# Patient Record
Sex: FEMALE | Race: WHITE | NOT HISPANIC OR LATINO | Employment: PART TIME | URBAN - NONMETROPOLITAN AREA
[De-identification: names, ages, dates, MRNs, and addresses within clinical notes are randomized per-mention and may not be internally consistent; named-entity substitution may affect disease eponyms.]

---

## 2023-08-07 ENCOUNTER — OFFICE VISIT (OUTPATIENT)
Dept: PRIMARY CARE | Facility: CLINIC | Age: 23
End: 2023-08-07
Payer: COMMERCIAL

## 2023-08-07 VITALS
HEART RATE: 87 BPM | BODY MASS INDEX: 33.09 KG/M2 | OXYGEN SATURATION: 98 % | DIASTOLIC BLOOD PRESSURE: 62 MMHG | WEIGHT: 216 LBS | SYSTOLIC BLOOD PRESSURE: 102 MMHG

## 2023-08-07 DIAGNOSIS — K21.00 GASTROESOPHAGEAL REFLUX DISEASE WITH ESOPHAGITIS WITHOUT HEMORRHAGE: Primary | ICD-10-CM

## 2023-08-07 DIAGNOSIS — F41.9 ANXIETY: ICD-10-CM

## 2023-08-07 DIAGNOSIS — J04.0 LARYNGITIS: ICD-10-CM

## 2023-08-07 DIAGNOSIS — Z72.89 ENGAGES IN VAPING: ICD-10-CM

## 2023-08-07 DIAGNOSIS — F12.10 CANNABIS ABUSE, DAILY USE: ICD-10-CM

## 2023-08-07 PROCEDURE — 99214 OFFICE O/P EST MOD 30 MIN: CPT | Performed by: FAMILY MEDICINE

## 2023-08-07 RX ORDER — SERTRALINE HYDROCHLORIDE 100 MG/1
100 TABLET, FILM COATED ORAL DAILY
COMMUNITY
Start: 2022-11-30 | End: 2023-08-07 | Stop reason: SINTOL

## 2023-08-07 RX ORDER — OMEPRAZOLE 20 MG/1
20 CAPSULE, DELAYED RELEASE ORAL
Qty: 30 CAPSULE | Refills: 2 | Status: SHIPPED | OUTPATIENT
Start: 2023-08-07 | End: 2023-11-05

## 2023-08-07 NOTE — PROGRESS NOTES
Subjective   Patient ID: Alexander Yoon is a 22 y.o. female who presents for Establish Care (Laryngitis frequently.).    HPI     New pt today   Use to be here as a kid - I know her family     Started to get hoarse several mos ago - getting worse     Started to work at [employer] in August 2022  - getting worse since then     Hx of vocal cord nodule when she was young - not sure what happened with that       Was placed on zoloft last year for anxiety -   Made her nauseous  - stopped it   Anxiety issues off and on  -   Not as bad now   Does not feel like she needs meds    MJ smoker daily  -  started at age 19 when her boyfriend broke up with her  -   Had dated him for 5 years   Will take week long breaks  from [employer]J - feels anxiety will get a little worse     Anxiety and mood are better on it -   Sleeps better on it       Vaping daily  - nicotine     Alcohol -   Drinking on a regular basis  - but not as bad as when she turned 22  Parents and friends suggested she should calm down     Lives with parents  -   Work at [employer] -   - since last AUG 2022     Was in school a couple of years  - dropped out     Works out daily     She wonders about ADHD  - friends have mentioned it   Does notice her attention is in all kinds of directions  - has been that way a long time  Her managers at work are concerned about her attention span  She is not     Has not started [employer] yet   Periods are regular   Occas sexually active with men           Review of Systems    Objective   /62 (BP Location: Right arm, Patient Position: Sitting, BP Cuff Size: Large adult)   Pulse 87   Wt 98 kg (216 lb)   SpO2 98%   BMI 33.09 kg/m²     Physical Exam  Vitals reviewed.   Constitutional:       General: She is not in acute distress.     Appearance: Normal appearance.   HENT:      Head: Normocephalic and atraumatic.      Right Ear: Tympanic membrane and ear canal normal.      Left Ear: Tympanic membrane and ear canal normal.      Nose:  Nose normal. No congestion or rhinorrhea.      Mouth/Throat:      Mouth: Mucous membranes are dry.      Pharynx: Oropharynx is clear. No oropharyngeal exudate or posterior oropharyngeal erythema.      Comments: Hoarse voice   Eyes:      Extraocular Movements: Extraocular movements intact.      Conjunctiva/sclera: Conjunctivae normal.      Pupils: Pupils are equal, round, and reactive to light.   Cardiovascular:      Rate and Rhythm: Normal rate and regular rhythm.      Heart sounds: Normal heart sounds. No murmur heard.  Pulmonary:      Effort: Pulmonary effort is normal. No respiratory distress.      Breath sounds: Normal breath sounds. No wheezing.   Abdominal:      General: Abdomen is flat. Bowel sounds are normal.      Palpations: Abdomen is soft.   Musculoskeletal:      Cervical back: Neck supple. No tenderness.   Lymphadenopathy:      Cervical: No cervical adenopathy.   Skin:     General: Skin is warm and dry.      Findings: No rash.   Neurological:      General: No focal deficit present.      Mental Status: She is alert and oriented to person, place, and time.   Psychiatric:         Mood and Affect: Mood normal.         Thought Content: Thought content normal.         Assessment/Plan   Problem List Items Addressed This Visit          Medium    Laryngitis    Relevant Orders    Referral to ENT    Gastroesophageal reflux disease with esophagitis without hemorrhage - Primary    Relevant Medications    omeprazole (PriLOSEC) 20 mg DR capsule    Cannabis abuse, daily use    Engages in vaping    Anxiety        Her primary concern today is her laryngitis -   Discussed how MJ, Vaping and alcohol can all cause inflammation of larynx -   Could be silent aspiration  -   Start omeprazole 20 mg a day     Hx of vocal cord nodule when young -   Urged appt with ENT again - order placed     Discussed how she does use MJ to help her anxiety and possible ADHD  -   Might want to consider something safer for the long run  She does  not want meds at this time     Discussed my concerns about contaminate in MJ products     Urged [employer] -  was willing to schedule with me -   That can be follow up

## 2023-08-07 NOTE — PATIENT INSTRUCTIONS
STRONGLY consider not smoking, vaping or drinking alcohol for a month at least to see if that helps your voice    Take omeprazole 20 mg - one a day  - to calm acid production down in your stomach that can make you hoarse.     I would suggest an appt with an ENT doctor to look at the vocal cords again in case you have a nodule      You can make an appt with me for a well women exam in 1 - 2 months so we can see how this is going

## 2024-06-18 PROCEDURE — 87591 N.GONORRHOEAE DNA AMP PROB: CPT

## 2024-06-18 PROCEDURE — 87491 CHLMYD TRACH DNA AMP PROBE: CPT

## 2024-06-18 PROCEDURE — 88175 CYTOPATH C/V AUTO FLUID REDO: CPT

## 2024-06-19 ENCOUNTER — LAB REQUISITION (OUTPATIENT)
Dept: LAB | Facility: HOSPITAL | Age: 24
End: 2024-06-19
Payer: COMMERCIAL

## 2024-06-19 DIAGNOSIS — Z11.51 ENCOUNTER FOR SCREENING FOR HUMAN PAPILLOMAVIRUS (HPV): ICD-10-CM

## 2024-06-19 DIAGNOSIS — Z01.419 ENCOUNTER FOR GYNECOLOGICAL EXAMINATION (GENERAL) (ROUTINE) WITHOUT ABNORMAL FINDINGS: ICD-10-CM

## 2024-06-20 LAB
C TRACH RRNA SPEC QL NAA+PROBE: NEGATIVE
N GONORRHOEA DNA SPEC QL PROBE+SIG AMP: NEGATIVE

## 2024-06-29 LAB
CYTOLOGY CMNT CVX/VAG CYTO-IMP: NORMAL
LAB AP HPV GENOTYPE QUESTION: YES
LAB AP HPV HR: NORMAL
LAB AP PAP ADDITIONAL TESTS: NORMAL
LABORATORY COMMENT REPORT: NORMAL
PATH REPORT.TOTAL CANCER: NORMAL

## 2025-05-01 ENCOUNTER — OFFICE VISIT (OUTPATIENT)
Dept: PRIMARY CARE | Facility: CLINIC | Age: 25
End: 2025-05-01
Payer: COMMERCIAL

## 2025-05-01 VITALS
WEIGHT: 200 LBS | SYSTOLIC BLOOD PRESSURE: 100 MMHG | DIASTOLIC BLOOD PRESSURE: 60 MMHG | HEART RATE: 87 BPM | BODY MASS INDEX: 30.63 KG/M2 | OXYGEN SATURATION: 97 %

## 2025-05-01 DIAGNOSIS — F41.9 ANXIETY: Primary | ICD-10-CM

## 2025-05-01 PROCEDURE — 1036F TOBACCO NON-USER: CPT

## 2025-05-01 PROCEDURE — 99213 OFFICE O/P EST LOW 20 MIN: CPT

## 2025-05-01 RX ORDER — ESCITALOPRAM OXALATE 10 MG/1
10 TABLET ORAL DAILY
Qty: 30 TABLET | Refills: 5 | Status: SHIPPED | OUTPATIENT
Start: 2025-05-01 | End: 2025-10-28

## 2025-05-01 ASSESSMENT — PATIENT HEALTH QUESTIONNAIRE - PHQ9
2. FEELING DOWN, DEPRESSED OR HOPELESS: NOT AT ALL
SUM OF ALL RESPONSES TO PHQ9 QUESTIONS 1 AND 2: 0
1. LITTLE INTEREST OR PLEASURE IN DOING THINGS: NOT AT ALL

## 2025-05-01 NOTE — PATIENT INSTRUCTIONS
Start lexapro 5 mg for a week or two. This will be 1/2 tablet. After 1 week or so, you can go up to 10 mg.   It can take 4-6 weeks for the medication to work to its full extent, so if you do not feel it working yet do not give up on it. Let me know if you are having side effects otherwise we will re-evaluate at 4-6 weeks.   ANY thoughts of hurting yourself or anyone else, go to the ER.

## 2025-05-01 NOTE — PROGRESS NOTES
Subjective   Patient ID: Alexander Yoon is a 24 y.o. female who presents for Anxiety (3 weeks or so, just feels like can't get it under control.. Issue w/ bf,  issues w/ money.).  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   - financial stress, job stress, trouble with boyfriend   - not having panic attacks   - does not feel like she needs counseling at this time.     Current Medications[1]   Surgical History[2]   Medical History[3]  Social History[4]   Family History[5]   Review of Systems  10 point ROS negative except as otherwise noted in the HPI.      Objective   /60   Pulse 87   Wt 90.7 kg (200 lb)   SpO2 97%   BMI 30.63 kg/m²    Physical Exam  Vitals reviewed.   Constitutional:       Appearance: Normal appearance.   HENT:      Head: Normocephalic and atraumatic.   Eyes:      Extraocular Movements: Extraocular movements intact.      Pupils: Pupils are equal, round, and reactive to light.   Cardiovascular:      Rate and Rhythm: Normal rate and regular rhythm.      Pulses: Normal pulses.      Heart sounds: Normal heart sounds.   Pulmonary:      Effort: Pulmonary effort is normal.      Breath sounds: Normal breath sounds.   Musculoskeletal:         General: Normal range of motion.      Right lower leg: No edema.      Left lower leg: No edema.   Skin:     General: Skin is warm and dry.      Findings: No rash.   Neurological:      General: No focal deficit present.      Mental Status: She is alert and oriented to person, place, and time.   Psychiatric:          Mood and Affect: Mood normal.         Behavior: Behavior normal.           Assessment/Plan   Problem List Items Addressed This Visit       Anxiety - Primary  - Pt beginning to feel overwhelmed with anxiety. Short tempered, tearful. Has a lot of life stressors. Used to be on zoloft but didnt like the way she felt on it. Denies any SI/HI, self harm.   - start lexapro 5 mg -> 10 mg.   - f/u in 4-6 weeks to assess efficacy or sooner if needed  - Discussed how the medication works, side effects, and educated pt it may take 4-6 weeks to see full effects of medication and  how well it is working. Educated to go to the ER with any SI/HI/self harm. Follow up in 6 weeks.      Relevant Medications    escitalopram (Lexapro) 10 mg tablet       Discussed at visit any disease processes that were of concern as well as the risks, benefits and instructions on any new medication provided. Patient (and/or caretaker of patient if present) stated all questions were answered, and they voiced understanding of instructions.     Ambar Andrade PA-C          [1]   Current Outpatient Medications:     escitalopram (Lexapro) 10 mg tablet, Take 1 tablet (10 mg) by mouth once daily., Disp: 30 tablet, Rfl: 5  [2] History reviewed. No pertinent surgical history.  [3] History reviewed. No pertinent past medical history.  [4]   Social History  Tobacco Use    Smoking status: Former     Current packs/day: 0.00     Types: Cigarettes     Quit date:      Years since quittin.3    Smokeless tobacco: Never   Vaping Use    Vaping status: Every Day   Substance Use Topics    Alcohol use: Never    Drug use: Yes     Frequency: 7.0 times per week     Types: Marijuana     Comment: chronic   [5] No family history on file.

## 2025-06-09 ENCOUNTER — APPOINTMENT (OUTPATIENT)
Dept: PRIMARY CARE | Facility: CLINIC | Age: 25
End: 2025-06-09
Payer: COMMERCIAL

## 2025-09-17 ENCOUNTER — APPOINTMENT (OUTPATIENT)
Facility: CLINIC | Age: 25
End: 2025-09-17
Payer: COMMERCIAL